# Patient Record
(demographics unavailable — no encounter records)

---

## 2024-10-29 NOTE — PHYSICAL EXAM
[No Acute Distress] : no acute distress [Normal Oropharynx] : normal oropharynx [Normal Appearance] : normal appearance [Normal Rate/Rhythm] : normal rate/rhythm [Normal S1, S2] : normal s1, s2 [No Murmurs] : no murmurs [No Resp Distress] : no resp distress [Normal Rhythm and Effort] : normal rhythm and effort [Clear to Auscultation Bilaterally] : clear to auscultation bilaterally [No Clubbing] : no clubbing [No Cyanosis] : no cyanosis [No Edema] : no edema [Oriented x3] : oriented x3 [Normal Affect] : normal affect

## 2024-10-29 NOTE — HISTORY OF PRESENT ILLNESS
[Former] : former [>= 20 pack years] : >= 20 pack years [Never] : never [TextBox_4] : 82 yo F with a h/o recurrent lung cancer s/p FAVIOLA lobectomy in 10/2009, recent RLL lung cancer s/p definitive radiation therapy 5/14/14, COPD, DM, macular degeneration, osteoporosis who presents for initial pulmonary evaluation. Referred by . Referred by Dr. Sukhwinder Alatorre, [facility] [place], for pulmonary rehabiliation. Former smoker - 2 ppd, 60 years, 120 py, quit 2009 Meds: Anoro Ellipta, Albuterol, Furosemide 20 mg daily  CT chest 4/23/24: FAVIOLA lobectomy, postradiation changes in posterior RUL with associate bronchiectasis, atelectasis, pleural thickening, unchanged.  A few unchanged bilateral groundglass nodular opacities.  unchanged semisolid nodule in the right middle lobe 0.7 by, 0.4 cm.  Unchanged lobulated solid pulmonary nodule, 0.7 x 0.5 cm.  No pleural effusion.  No mediastinal or thoracic lymphadenopathy.  Developed palpitations recently after Jardiance - being re-evaluated by her Cardiologist next month. Denies significant cardiac history.  Becomes dyspneic on minimal exertion- 10 steps ET, uses a walker. Not dyspneic at rest. Denies daily cough or wheezing. States she uses Anoro and Albuterol as needed. +nasal congestion  PFT today: moderate obstructive defect, normal DLCO  [TextBox_11] : 2 [TextBox_13] : 60 [YearQuit] : 2009

## 2024-10-29 NOTE — CONSULT LETTER
[Dear  ___] : Dear  [unfilled], [Consult Letter:] : I had the pleasure of evaluating your patient, [unfilled]. [( Thank you for referring [unfilled] for consultation for _____ )] : Thank you for referring [unfilled] for consultation for [unfilled] [Please see my note below.] : Please see my note below. [Consult Closing:] : Thank you very much for allowing me to participate in the care of this patient.  If you have any questions, please do not hesitate to contact me. [Sincerely,] : Sincerely, [FreeTextEntry2] : Dr. Sukhwinder Alatorre [FreeTextEntry3] : Porsha Willis MD, FAASM  of Medicine Associate , Fellowship in Pulmonary and Critical Care Medicine Division of Pulmonary, Critical Care & Sleep Medicine [facility] [employer] [facility] at [facility]

## 2024-10-29 NOTE — ASSESSMENT
[FreeTextEntry1] : 82 yo F with a h/o recurrent lung cancer s/p FAVIOLA lobectomy in 10/2009, recent RLL lung cancer s/p definitive radiation therapy 5/14/14, COPD, DM, macular degeneration, osteoporosis Former smoker - 2 ppd, 60 years, 120 py, quit 2009 Previous pulmonary rehabilitation with improvement in her symptoms and ET. Meds: Anoro Ellipta, Albuterol, Furosemide 20 mg daily  Plan: 1. COPD - educated on maintenance inhaler and encouraged daily Anoro use, Albuterol pre exercise and as needed 2. Pulmonary rehab: Cardiology clearance- patient will request at her upcomng visit RIN appointment requested 3. HCM - doesn't receive Influenza vaccine - states she becomes ill with it Would like to follow up in 6 weeks after increasing her Anoro ellipta and re-eval of her symptoms

## 2024-10-29 NOTE — HISTORY OF PRESENT ILLNESS
[Former] : former [>= 20 pack years] : >= 20 pack years [Never] : never [TextBox_4] : 82 yo F with a h/o recurrent lung cancer s/p FAVIOLA lobectomy in 10/2009, recent RLL lung cancer s/p definitive radiation therapy 5/14/14, COPD, DM, macular degeneration, osteoporosis who presents for initial pulmonary evaluation. Referred by . Referred by Dr. Sukhwinder Alatorre, [facility] [place], for pulmonary rehabiliation. Former smoker - 2 ppd, 60 years, 120 py, quit 2009 Meds: Anoro Ellipta, Albuterol, Furosemide 20 mg daily  CT chest 4/23/24: FAVIOLA lobectomy, postradiation changes in posterior RUL with associate bronchiectasis, atelectasis, pleural thickening, unchanged.  A few unchanged bilateral groundglass nodular opacities.  unchanged semisolid nodule in the right middle lobe 0.7 by, 0.4 cm.  Unchanged lobulated solid pulmonary nodule, 0.7 x 0.5 cm.  No pleural effusion.  No mediastinal or thoracic lymphadenopathy.  Developed palpitations recently after Jardiance - being re-evaluated by her Cardiologist next month. Denies significant cardiac history.  Becomes dyspneic on minimal exertion- 10 steps ET, uses a walker. Not dyspneic at rest. Denies daily cough or wheezing. States she uses Anoro and Albuterol as needed. +nasal congestion  PFT today: moderate obstructive defect, normal DLCO  [TextBox_11] : 2 [TextBox_13] : 60 [YearQuit] : 2009

## 2024-10-29 NOTE — CONSULT LETTER
[Dear  ___] : Dear  [unfilled], [Consult Letter:] : I had the pleasure of evaluating your patient, [unfilled]. [( Thank you for referring [unfilled] for consultation for _____ )] : Thank you for referring [unfilled] for consultation for [unfilled] [Please see my note below.] : Please see my note below. [Consult Closing:] : Thank you very much for allowing me to participate in the care of this patient.  If you have any questions, please do not hesitate to contact me. [Sincerely,] : Sincerely, [FreeTextEntry2] : Dr. Sukhwinder Alatorre [FreeTextEntry3] : Porsha Willis MD, FAASM  of Medicine Associate , Fellowship in Pulmonary and Critical Care Medicine Division of Pulmonary, Critical Care & Sleep Medicine [facility] [employer] [facility] at [facility]

## 2024-10-29 NOTE — REVIEW OF SYSTEMS
[Nasal Congestion] : nasal congestion [Arthralgias] : arthralgias [Obesity] : obesity [Negative] : Gastrointestinal [TextBox_30] : per hpi [TextBox_44] : per hpi [TextBox_113] : per hpi

## 2024-10-29 NOTE — ASSESSMENT
[FreeTextEntry1] : 84 yo F with a h/o recurrent lung cancer s/p FAVIOLA lobectomy in 10/2009, recent RLL lung cancer s/p definitive radiation therapy 5/14/14, COPD, DM, macular degeneration, osteoporosis Former smoker - 2 ppd, 60 years, 120 py, quit 2009 Previous pulmonary rehabilitation with improvement in her symptoms and ET. Meds: Anoro Ellipta, Albuterol, Furosemide 20 mg daily  Plan: 1. COPD - educated on maintenance inhaler and encouraged daily Anoro use, Albuterol pre exercise and as needed 2. Pulmonary rehab: Cardiology clearance- patient will request at her upcomng visit RIN appointment requested 3. HCM - doesn't receive Influenza vaccine - states she becomes ill with it Would like to follow up in 6 weeks after increasing her Anoro ellipta and re-eval of her symptoms